# Patient Record
Sex: MALE | Race: WHITE | NOT HISPANIC OR LATINO | Employment: FULL TIME | URBAN - METROPOLITAN AREA
[De-identification: names, ages, dates, MRNs, and addresses within clinical notes are randomized per-mention and may not be internally consistent; named-entity substitution may affect disease eponyms.]

---

## 2023-06-05 LAB — PROSTATE SPECIFIC AG (NG/ML) IN SER/PLAS: 0.47 NG/ML (ref 0–4)

## 2023-10-13 PROBLEM — R36.1 HEMATOSPERMIA: Status: ACTIVE | Noted: 2023-10-13

## 2023-10-13 PROBLEM — D12.6 ADENOMATOUS POLYP OF COLON: Status: ACTIVE | Noted: 2023-10-13

## 2023-10-13 PROBLEM — R82.90 ABNORMAL URINALYSIS: Status: ACTIVE | Noted: 2023-10-13

## 2023-10-13 RX ORDER — DOXYCYCLINE 100 MG/1
1 TABLET ORAL 2 TIMES DAILY
COMMUNITY
Start: 2021-12-16 | End: 2023-10-16 | Stop reason: ALTCHOICE

## 2023-10-16 ENCOUNTER — OFFICE VISIT (OUTPATIENT)
Dept: UROLOGY | Facility: CLINIC | Age: 58
End: 2023-10-16
Payer: COMMERCIAL

## 2023-10-16 VITALS
WEIGHT: 211.9 LBS | BODY MASS INDEX: 30.84 KG/M2 | DIASTOLIC BLOOD PRESSURE: 97 MMHG | HEART RATE: 70 BPM | SYSTOLIC BLOOD PRESSURE: 148 MMHG

## 2023-10-16 DIAGNOSIS — Z12.5 PROSTATE CANCER SCREENING: Primary | ICD-10-CM

## 2023-10-16 DIAGNOSIS — R68.82 LOW LIBIDO: ICD-10-CM

## 2023-10-16 PROCEDURE — 1036F TOBACCO NON-USER: CPT | Performed by: STUDENT IN AN ORGANIZED HEALTH CARE EDUCATION/TRAINING PROGRAM

## 2023-10-16 PROCEDURE — 99214 OFFICE O/P EST MOD 30 MIN: CPT | Performed by: STUDENT IN AN ORGANIZED HEALTH CARE EDUCATION/TRAINING PROGRAM

## 2023-10-16 NOTE — PROGRESS NOTES
Tung presents for a follow up evaluation.  The patient’s EMR has been reviewed.  Lives in [place], [place].  Occupation: Supervisor at the [employer]  Kindly referred by Dr. Paulino for hematospermia  Treated with doxycycline previously for hematospermia.  Has significant family history of prostate cancer  PSA looks good, is 0.46 --> 0.61 (July 2022) -> 0.47 (June 2023).    SUBJECTIVE: HPI   TODAY (10/16/23)  His hematospermia has since resolved.  States that the sensation during orgasm has been less recently.  Concerned he may have low testosterone and wishes to have this evaluated. Otherwise, he denies any voiding issues or other sexual health concerns.      TO REVIEW: Last evaluation (01/19/23)  Reports that the hematospermia persists. Described as having a brownish hue to his ejaculate. Otherwise, no acute or worsening urinary symptoms. Denies dysuria, frequency, urgency, nocturia, gross hematuria, hesitancy, incomplete bladder emptying, or suprapubic pain. Denies being on any daily medications, no blood thinning medications either.      To REVIEW:  He reports for a year he has noted red color to his semen  It has been persistent with at least dark colored semen throughout this time  Denies gross hematuria, UTIs, or stones  Denies STIs. No urethral discharge or dysuria.   He and his wife have sex monthly, denies other sexual partners  Had a vasectomy 15 years ago  Denies urinary symptoms such as nocturia, frequency, or urgency   Has never taken antibiotics for this     His older brother was diagnosed with prostate cancer and passed away from lung metastasis   He was diagnosed at in his early 50s, passed away at age 59  His PSA was checked in Aug 2021 and is 0.41     Oneal any PMH  His PSH includes colonoscopy with removal of a large polyp, hernia surgery  Denies tobacco use  His mother had colon cancer     Past medical, surgical, family and social history in the chart was reviewed and is accurate including any  additions to what is in this HPI.    Review of Systems   Constitutional: denies any unintentional weight loss or change in strength.  Integumentary: denies any rashes or pruritus.  Eyes: denies any double vision or eye pain.  Ear/Nose/Mouth/Throat: denies any nosebleeds or gum bleeds.  Cardiovascular: denies any chest pain or syncope.  Respiratory: denies hemoptysis.  Gastrointestinal: denies nausea or vomiting.  Musculoskeletal: denies muscle cramping or weakness.  Neurologic: denies convulsions or seizures.  Hematologic/Lymphatic: denies bleeding tendencies.  Endocrine: denies heat/cold intolerance.  All other systems have been reviewed and are negative unless otherwise noted in the HPI.    OBJECTIVE:  Visit Vitals  BP (!) 148/97   Pulse 70     Physical Exam   Constitutional: No obvious distress.  Eyes: Non-injected conjunctiva, sclera clear, EOMI.  Ears/Nose/Mouth/Throat: No obvious drainage per ears or nose.  Cardiovascular: Extremities are warm and well perfused. No edema, cyanosis or pallor.  Respiratory: No audible wheezing/stridor; respirations do not appear labored.  Gastrointestinal: Abdomen soft, not distended.  Musculoskeletal: Normal ROM of extremities.  Skin: No obvious rashes or open sores.  Neurologic: Alert and oriented, CN 2-12 grossly intact.  Psychiatric: Answers questions appropriately with normal affect.  Hematologic/Lymphatic/Immunologic: No obvious bruises or sites of spontaneous bleeding.  Genitourinary: No CVA tenderness, bladder not palpable.     Labs and imaging:  Lab Results   Component Value Date    CHOL 233 (H) 08/04/2021    TRIG 106 08/04/2021    HDL 57.1 08/04/2021    PSA 0.47 06/05/2023    GLUF 90 08/04/2021         ASSESSMENT:  Problem List Items Addressed This Visit    None  Visit Diagnoses       Prostate cancer screening    -  Primary    Relevant Orders    PSA    Low libido        Relevant Orders    Testosterone    Follow Up In Urology           PLAN:  Continue PSA surveillance.  Latest 0.47 (June 2023).  Reports concern of low libido.   Will evaluate for low T.   Schedule follow up to review results.     All questions were answered to the patient’s satisfaction.  Patient agrees with the plan and wishes to proceed.  Follow-up will be scheduled appropriately.     Scribed for Dr. Eron Rainey by Dima Jhaveri.  I, Dr. Eron Rainey have personally reviewed and agreed with the information entered by the Virtual Scribe. 10/16/23.

## 2023-10-23 ENCOUNTER — LAB (OUTPATIENT)
Dept: LAB | Facility: LAB | Age: 58
End: 2023-10-23
Payer: COMMERCIAL

## 2023-10-23 DIAGNOSIS — R68.82 LOW LIBIDO: ICD-10-CM

## 2023-10-23 LAB — TESTOST SERPL-MCNC: 437 NG/DL (ref 240–1000)

## 2023-10-23 PROCEDURE — 84403 ASSAY OF TOTAL TESTOSTERONE: CPT

## 2023-10-23 PROCEDURE — 36415 COLL VENOUS BLD VENIPUNCTURE: CPT

## 2023-12-06 ENCOUNTER — OFFICE VISIT (OUTPATIENT)
Dept: UROLOGY | Facility: CLINIC | Age: 58
End: 2023-12-06
Payer: COMMERCIAL

## 2023-12-06 VITALS
TEMPERATURE: 98.2 F | WEIGHT: 205 LBS | HEIGHT: 70 IN | BODY MASS INDEX: 29.35 KG/M2 | SYSTOLIC BLOOD PRESSURE: 144 MMHG | HEART RATE: 63 BPM | DIASTOLIC BLOOD PRESSURE: 93 MMHG

## 2023-12-06 DIAGNOSIS — Z12.5 PROSTATE CANCER SCREENING: ICD-10-CM

## 2023-12-06 DIAGNOSIS — R36.1 HEMATOSPERMIA: Primary | ICD-10-CM

## 2023-12-06 PROCEDURE — 99214 OFFICE O/P EST MOD 30 MIN: CPT | Performed by: STUDENT IN AN ORGANIZED HEALTH CARE EDUCATION/TRAINING PROGRAM

## 2023-12-06 PROCEDURE — 1036F TOBACCO NON-USER: CPT | Performed by: STUDENT IN AN ORGANIZED HEALTH CARE EDUCATION/TRAINING PROGRAM

## 2023-12-06 NOTE — PROGRESS NOTES
Tung presents for a follow up visit..  The patient’s EMR has been reviewed.  Lives in [place], [place].  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 Paulino for hematospermia  Treated with doxycycline, since resolved.  Has significant family history of prostate cancer  PSA looks good, is 0.46 --> 0.61 (July 2022) -> 0.47 (June 2023).  Last visit, reported concern of low T 2/2 low libido.   Presents today to review testosterone results.   Testosterone wnl (437), Oct 2023.     SUBJECTIVE: HPI   TODAY (12/06/23)  Patient reassured to normal testosterone results.   He has been doing well overall.   No acute or worsening complaints.     TO REVIEW: Last evaluation (10/16/23)  His hematospermia has since resolved.  States that the sensation during orgasm has been less recently.  Concerned he may have low testosterone and wishes to have this evaluated. Otherwise, he denies any voiding issues or other sexual health concerns.      TO REVIEW: (01/19/23)  Reports that the hematospermia persists. Described as having a brownish hue to his ejaculate. Otherwise, no acute or worsening urinary symptoms. Denies dysuria, frequency, urgency, nocturia, gross hematuria, hesitancy, incomplete bladder emptying, or suprapubic pain. Denies being on any daily medications, no blood thinning medications either.      To REVIEW:  He reports for a year he has noted red color to his semen  It has been persistent with at least dark colored semen throughout this time  Denies gross hematuria, UTIs, or stones  Denies STIs. No urethral discharge or dysuria.   He and his wife have sex monthly, denies other sexual partners  Had a vasectomy 15 years ago  Denies urinary symptoms such as nocturia, frequency, or urgency   Has never taken antibiotics for this     His older brother was diagnosed with prostate cancer and passed away from lung metastasis   He was diagnosed at in his early 50s, passed away at age 59  His PSA was checked in Aug  2021 and is 0.41     Oneal any PMH  His PSH includes colonoscopy with removal of a large polyp, hernia surgery  Denies tobacco use  His mother had colon cancer     Past medical, surgical, family and social history in the chart was reviewed and is accurate including any additions to what is in this HPI.    Review of Systems   Constitutional: denies any unintentional weight loss or change in strength.  Integumentary: denies any rashes or pruritus.  Eyes: denies any double vision or eye pain.  Ear/Nose/Mouth/Throat: denies any nosebleeds or gum bleeds.  Cardiovascular: denies any chest pain or syncope.  Respiratory: denies hemoptysis.  Gastrointestinal: denies nausea or vomiting.  Musculoskeletal: denies muscle cramping or weakness.  Neurologic: denies convulsions or seizures.  Hematologic/Lymphatic: denies bleeding tendencies.  Endocrine: denies heat/cold intolerance.  All other systems have been reviewed and are negative unless otherwise noted in the HPI.    OBJECTIVE:  Visit Vitals  BP (!) 144/93   Pulse 63   Temp 36.8 °C (98.2 °F)     Physical Exam   Constitutional: No obvious distress.  Eyes: Non-injected conjunctiva, sclera clear, EOMI.  Ears/Nose/Mouth/Throat: No obvious drainage per ears or nose.  Cardiovascular: Extremities are warm and well perfused. No edema, cyanosis or pallor.  Respiratory: No audible wheezing/stridor; respirations do not appear labored.  Gastrointestinal: Abdomen soft, not distended.  Musculoskeletal: Normal ROM of extremities.  Skin: No obvious rashes or open sores.  Neurologic: Alert and oriented, CN 2-12 grossly intact.  Psychiatric: Answers questions appropriately with normal affect.  Hematologic/Lymphatic/Immunologic: No obvious bruises or sites of spontaneous bleeding.  Genitourinary: No CVA tenderness, bladder not palpable.     Labs and imaging:  Lab Results   Component Value Date    CHOL 233 (H) 08/04/2021    TRIG 106 08/04/2021    HDL 57.1 08/04/2021    PSA 0.47 06/05/2023     GLUF 90 08/04/2021     ASSESSMENT:  Problem List Items Addressed This Visit       Hematospermia - Primary    Prostate cancer screening    Relevant Orders    PSA      PLAN:  Follow up in about 6 months with PSA prior to.   All questions were answered to the patient’s satisfaction.  Patient agrees with the plan and wishes to proceed.    Scribed for Dr. Eron Rainey by Dima Jhaveri.  I, Dr. Eron Rainey have personally reviewed and agreed with the information entered by the Virtual Scribe. 12/06/23.

## 2024-01-03 ENCOUNTER — APPOINTMENT (OUTPATIENT)
Dept: UROLOGY | Facility: CLINIC | Age: 59
End: 2024-01-03
Payer: COMMERCIAL